# Patient Record
Sex: MALE | Race: WHITE
[De-identification: names, ages, dates, MRNs, and addresses within clinical notes are randomized per-mention and may not be internally consistent; named-entity substitution may affect disease eponyms.]

---

## 2017-01-07 ENCOUNTER — HOSPITAL ENCOUNTER (EMERGENCY)
Dept: HOSPITAL 17 - PHEFT | Age: 42
Discharge: HOME | End: 2017-01-07
Payer: COMMERCIAL

## 2017-01-07 VITALS
TEMPERATURE: 98.3 F | DIASTOLIC BLOOD PRESSURE: 80 MMHG | OXYGEN SATURATION: 96 % | HEART RATE: 81 BPM | RESPIRATION RATE: 16 BRPM | SYSTOLIC BLOOD PRESSURE: 109 MMHG

## 2017-01-07 VITALS — WEIGHT: 205.03 LBS | BODY MASS INDEX: 24.97 KG/M2 | HEIGHT: 76 IN
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
## 2017-01-07 DIAGNOSIS — S61.210A: Primary | ICD-10-CM

## 2017-01-07 PROCEDURE — 96372 THER/PROPH/DIAG INJ SC/IM: CPT

## 2017-01-07 PROCEDURE — 73140 X-RAY EXAM OF FINGER(S): CPT

## 2017-01-07 PROCEDURE — 90714 TD VACC NO PRESV 7 YRS+ IM: CPT

## 2017-01-07 PROCEDURE — 12001 RPR S/N/AX/GEN/TRNK 2.5CM/<: CPT

## 2017-01-07 PROCEDURE — 90471 IMMUNIZATION ADMIN: CPT

## 2017-01-07 NOTE — RADHPO
EXAM DATE/TIME:  01/07/2017 19:29 

 

[facility] COMPARISON:     

No previous studies available for comparison.

 

                     

INDICATIONS :     

Right hand second digit pain and laceration. Patient states he put his hand through a window. 

                     

 

MEDICAL HISTORY :     

None.          

 

SURGICAL HISTORY :     

None.   

 

ENCOUNTER:     

Initial                                        

 

ACUITY:     

1 day      

 

PAIN SCORE:     

9/10

 

LOCATION:     

Right  hand, second digit.

 

FINDINGS:     

Examination of the second digit of the right hand demonstrates no evidence of fracture or dislocation
.  No radiopaque foreign bodies are seen.  The soft tissues are intact. No foreign body.

 

 

CONCLUSION:     

Unremarkable examination of the right second finger.  

 

 

 

 Dmitriy Aguayo MD on January 07, 2017 at 19:36           

Board Certified Radiologist.

 This report was verified electronically.

## 2017-03-31 ENCOUNTER — HOSPITAL ENCOUNTER (OUTPATIENT)
Dept: HOSPITAL 17 - PHSDC | Age: 42
Discharge: HOME | End: 2017-03-31
Attending: PAIN MEDICINE
Payer: COMMERCIAL

## 2017-03-31 DIAGNOSIS — M54.6: Primary | ICD-10-CM

## 2017-03-31 PROCEDURE — 99152 MOD SED SAME PHYS/QHP 5/>YRS: CPT

## 2017-03-31 PROCEDURE — 64492 INJ PARAVERT F JNT C/T 3 LEV: CPT

## 2017-03-31 PROCEDURE — 64491 INJ PARAVERT F JNT C/T 2 LEV: CPT

## 2017-03-31 PROCEDURE — 64490 INJ PARAVERT F JNT C/T 1 LEV: CPT

## 2017-04-05 NOTE — M6
cc:

GREGORIA SPRINGER M.D.

****

 

 

DATE

03/31/2017

 

DATE OF BIRTH

1975

 

PROCEDURE

Fluoroscopically guided injection bilateral thoracic facet joints (bilateral

T5-6, T6-7 and T7-8 facet joints).

 

History and physical was completed and signed. Consent was signed. Procedure

site was marked. Medications were listed and reconciled. Pain score was

recorded. Allergies were noted. Time out was taken. Fluoroscopy time was

recorded where applicable.

Sedation was administered or directed by Dr. Springer. The patient was given

oxygen. The patient was monitored by a registered nurse. Total procedure time

was greater than 15 minutes.

 

 

PROCEDURE NOTE

IV was started.  Blood pressure cuff, pulse oximeter and EKG were applied.  The

patient was placed in the prone position on a Lars table, sedated with small

amounts of propofol titrated to effect.  Vital signs were monitored and

remained stable throughout the procedure.  The lumbar area was prepped with

alcohol and 10% Betadine solution and draped with sterile drapes.  Fluoroscopy

was used to visualize the bilateral thoracic facets at T5-6, T6-7 and T7-8.

Separate sterile 3-1/2-inch, 25-gauge spinal needles were advanced down to

these joints under fluoroscopic guidance.  There was negative aspiration for

blood or any other type of fluid.  At each location the patient was given 1 mL

of 0.5% Marcaine and 10 mg of Kenalog.

 

 

Following the procedure the patient was taken to the recovery room with stable

vital signs, neurologically intact.  He will be evaluated immediately and with

followup to determine if he has a

 

 

 

subjective decrease in his usual pain and a corresponding objective increase in

his functional capabilities.

 

 

                              _________________________________

                              W. MD LIZZY Cabello/KARINE

D:  3/31/2017/8:23 AM

T:  4/5/2017/1:30 PM

Visit #:  [id number]

Job #:  [id number]

## 2017-05-12 ENCOUNTER — HOSPITAL ENCOUNTER (EMERGENCY)
Dept: HOSPITAL 17 - NEPE | Age: 42
LOS: 1 days | Discharge: HOME | End: 2017-05-13
Payer: COMMERCIAL

## 2017-05-12 VITALS
TEMPERATURE: 97.8 F | SYSTOLIC BLOOD PRESSURE: 136 MMHG | HEART RATE: 69 BPM | RESPIRATION RATE: 14 BRPM | DIASTOLIC BLOOD PRESSURE: 73 MMHG | OXYGEN SATURATION: 98 %

## 2017-05-12 VITALS — RESPIRATION RATE: 19 BRPM | OXYGEN SATURATION: 99 %

## 2017-05-12 VITALS — HEIGHT: 76 IN | BODY MASS INDEX: 24.7 KG/M2 | WEIGHT: 202.83 LBS

## 2017-05-12 DIAGNOSIS — F32.9: ICD-10-CM

## 2017-05-12 DIAGNOSIS — K21.9: ICD-10-CM

## 2017-05-12 DIAGNOSIS — R60.0: Primary | ICD-10-CM

## 2017-05-12 DIAGNOSIS — F41.9: ICD-10-CM

## 2017-05-12 DIAGNOSIS — J45.909: ICD-10-CM

## 2017-05-12 LAB
ALP SERPL-CCNC: 73 U/L (ref 45–117)
ALT SERPL-CCNC: 23 U/L (ref 12–78)
ANION GAP SERPL CALC-SCNC: 7 MEQ/L (ref 5–15)
AST SERPL-CCNC: 23 U/L (ref 15–37)
BASOPHILS # BLD AUTO: 0 TH/MM3 (ref 0–0.2)
BASOPHILS NFR BLD: 0.9 % (ref 0–2)
BILIRUB SERPL-MCNC: 0.2 MG/DL (ref 0.2–1)
BUN SERPL-MCNC: 15 MG/DL (ref 7–18)
CHLORIDE SERPL-SCNC: 104 MEQ/L (ref 98–107)
EOSINOPHIL # BLD: 0.1 TH/MM3 (ref 0–0.4)
EOSINOPHIL NFR BLD: 2 % (ref 0–4)
ERYTHROCYTE [DISTWIDTH] IN BLOOD BY AUTOMATED COUNT: 13 % (ref 11.6–17.2)
GFR SERPLBLD BASED ON 1.73 SQ M-ARVRAT: 100 ML/MIN (ref 89–?)
HCO3 BLD-SCNC: 31.4 MEQ/L (ref 21–32)
HCT VFR BLD CALC: 41.5 % (ref 39–51)
HEMO FLAGS: (no result)
LYMPHOCYTES # BLD AUTO: 2.3 TH/MM3 (ref 1–4.8)
LYMPHOCYTES NFR BLD AUTO: 42.2 % (ref 9–44)
MCH RBC QN AUTO: 30.9 PG (ref 27–34)
MCHC RBC AUTO-ENTMCNC: 34.5 % (ref 32–36)
MCV RBC AUTO: 89.5 FL (ref 80–100)
MONOCYTES NFR BLD: 8.8 % (ref 0–8)
NEUTROPHILS # BLD AUTO: 2.5 TH/MM3 (ref 1.8–7.7)
NEUTROPHILS NFR BLD AUTO: 46.1 % (ref 16–70)
PLATELET # BLD: 263 TH/MM3 (ref 150–450)
POTASSIUM SERPL-SCNC: 3.5 MEQ/L (ref 3.5–5.1)
RBC # BLD AUTO: 4.64 MIL/MM3 (ref 4.5–5.9)
SODIUM SERPL-SCNC: 142 MEQ/L (ref 136–145)
WBC # BLD AUTO: 5.4 TH/MM3 (ref 4–11)

## 2017-05-12 PROCEDURE — 93970 EXTREMITY STUDY: CPT

## 2017-05-12 PROCEDURE — 85025 COMPLETE CBC W/AUTO DIFF WBC: CPT

## 2017-05-12 PROCEDURE — 96374 THER/PROPH/DIAG INJ IV PUSH: CPT

## 2017-05-12 PROCEDURE — 80053 COMPREHEN METABOLIC PANEL: CPT

## 2017-05-12 PROCEDURE — 99284 EMERGENCY DEPT VISIT MOD MDM: CPT

## 2017-05-12 PROCEDURE — 80185 ASSAY OF PHENYTOIN TOTAL: CPT

## 2017-05-12 PROCEDURE — 71010: CPT

## 2017-05-12 NOTE — RADRPT
EXAM DATE/TIME:  05/12/2017 22:20 

 

[facility] COMPARISON:     

No previous studies available for comparison.

        

 

 

INDICATIONS :                

Bilateral leg swelling.

            

 

MEDICAL HISTORY :     

Gastroparesis. Seizures.  Asthma. Depression. Anxiety. MRSA. Throrasic spine fracture.

 

SURGICAL HISTORY :     

None. 

 

ENCOUNTER:     

Initial

 

ACUITY:     

1 day

 

PAIN SCORE:      

2/10

 

LOCATION:      

Bilateral  legs.

                       

 

TECHNIQUE:     

Venous ultrasound of the left and right leg was performed from the inguinal ligament to the proximal 
calf.  Real-time, color Doppler and spectral tracing, compression and augmentation techniques were us
ed.  

 

FINDINGS:     

 

RIGHT LEG:     

There is normal compressibility of the deep venous system from the inguinal region to the proximal ca
lf.  No echogenic clot is seen in the lumen of the common femoral, femoral, popliteal, and posterior 
tibial veins.  There is a normal response of the venous system to proximal and distal augmentation an
d respiration.  

 

LEFT LEG:     

There is normal compressibility of the deep venous system from the inguinal region to the proximal ca
lf.  No echogenic clot is seen in the lumen of the common femoral, femoral, popliteal, and posterior 
tibial veins.  There is a normal response of the venous system to proximal and distal augmentation an
d respiration.  

 

CONCLUSION:     

No evidence of DVT.

 

 

 

 Ron Horan MD on May 12, 2017 at 23:29           

Board Certified Radiologist.

 This report was verified electronically.

## 2017-05-12 NOTE — PD
Physical Exam


Date Seen by Provider:  May 12, 2017


Time Seen by Provider:  21:44


Narrative


41 YOWM C/O ANKLE EDEMA TODAY. THE L>R .PAIN 5/10. NO RECENT ILLNESS.





VSS





AWAITING BED PLACEMENT





Data


Data


Last Documented VS





Vital Signs








  Date Time  Temp Pulse Resp B/P Pulse Ox O2 Delivery O2 Flow Rate FiO2


 


5/12/17 21:40 97.8 69 14 136/73 98 Room Air  











[facility]


Medical Record Reviewed:  No


Supervised Visit with ASHWINI:  Haja Hinds May 12, 2017 21:48

## 2017-05-12 NOTE — RADRPT
EXAM DATE/TIME:  05/12/2017 22:51 

 

[facility] COMPARISON:     

No previous studies available for comparison.

 

                     

INDICATIONS :     

Patient complains of left ankle pain and swelling. Patient states they have no chest complaints. 

                     

 

MEDICAL HISTORY :     

None.          

 

SURGICAL HISTORY :     

None.   

 

ENCOUNTER:     

Initial                                        

 

ACUITY:     

1 day      

 

PAIN SCORE:     

0/10

 

LOCATION:      

chest 

 

FINDINGS:     

A single view of the chest demonstrates the lungs to be symmetrically aerated without evidence of mas
s, infiltrate or effusion.  The cardiomediastinal contours are unremarkable.  Osseous structures are 
intact.

 

CONCLUSION:     Unremarkable exam.

 

 

 

 Ron Horan MD on May 12, 2017 at 23:22           

Board Certified Radiologist.

 This report was verified electronically.

## 2017-05-12 NOTE — PD
HPI


Chief Complaint:  Edema


Time Seen by Provider:  21:57


Travel History


International Travel<30 days:  No


Contact w/Intl Traveler<30days:  No


Traveled to known affect area:  No





History of Present Illness


HPI


This is a 41-year-old male with a history of anxiety disorder, seizure disorder

, percent still complained of bilateral lower extremity edema.  Patient reports 

left greater than right.  Patient has no previous history of swelling of his 

lower extremity is.  The patient denies any long car or plane rides.  The 

patient denies any tobacco history.  The patient denies any estrogen use.  

There is no reported history of significant trauma to his lower extremities.  

The patient denies any change in his current medications.  There is no history 

of cardiac disease.  He states he's on his feet pretty much all day.  He works 

as a  for the [employer].  There is no chest pain, chest 

pressure.  There is no reported shortness of breath.





PFSH


Past Medical History


Asthma:  Yes


Autoimmune Disease:  No


Anxiety:  Yes (CHRISTA-DR. ARNOLD)


Depression:  Yes


Heart Rhythm Problems:  No


Cancer:  No


Cardiac Catheterization:  No


Cardiovascular Problems:  No


High Cholesterol:  No


Congestive Heart Failure:  No


Cerebrovascular Accident:  No


Diabetes:  No


Diminished Hearing:  No


Endocrine:  No


Gastrointestinal Disorders:  Yes (DECREASED GASTRIC  EMPTYING)


GERD:  Yes (GASTROPARESIS)


Genitourinary:  No


Headaches:  No


Hypertension:  No


Immune Disorder:  No


Implanted Vascular Access Dvce:  No


Musculoskeletal:  Yes ( HX T6 FX  FOLLOWING SEIZURE.)


Reproductive:  No


Integumentary:  Yes (MRSA (SKIN))


Immunizations Current:  Yes


Migraines:  No


Myocardial Infarction:  No


Seizures:  Yes


Thyroid Disease:  No


Tetanus Vaccination:  < 5 Years


PNEUMOCCOCAL Vaccine (Year):  2





Past Surgical History


Surgical History:  No Previous Surgery


Abdominal Surgery:  No


Cardiac Surgery:  No


Coronary Artery Bypass Graft:  No


Ear Surgery:  No


Endocrine Surgery:  No


Eye Surgery:  No


Genitourinary Surgery:  No


Gynecologic Surgery:  No


Neurologic Surgery:  No


Oral Surgery:  No


Thoracic Surgery:  No


Other Surgery:  No





Social History


Alcohol Use:  No


Tobacco Use:  No (QUIT 2006)


Substance Use:  No





Allergies-Medications


(Allergen,Severity, Reaction):  


Coded Allergies:  


     Adhesives (Verified  Allergy, Severe, RED RASH, 5/12/17)


     Keflex (Verified  Allergy, Severe, HIVES, 5/12/17)


     Penicillin (Verified  Allergy, Severe, HIVES, 5/12/17)


     *MDRO Multi-Drug Resistant Organism (Verified  Allergy, Unknown, 5/12/17)


 mrsa abd wound 2009


Reported Meds & Prescriptions





Reported Meds & Active Scripts


Active


Lasix (Furosemide) 20 Mg Tab 20 Mg PO DAILY


Reported


Percocet (Oxycodone-Acetaminophen)  mg Tab 1 Tab PO BID PRN


Omeprazole 40 Mg Cap 40 Mg PO DAILY


Ativan (Lorazepam) 0.5 Mg Tab 0.5 Mg PO DAILY PRN


Dilantin (Phenytoin Extended) 100 Mg Cap 300 Mg PO DAILY








Review of Systems


Except as stated in HPI:  all other systems reviewed are Neg


General / Constitutional:  No: Fever, Chills


HENT:  No: Headaches, Lightheadedness


Cardiovascular:  No: Chest Pain or Discomfort, Palpitations


Respiratory:  No: Cough, Shortness of Breath


Gastrointestinal:  No: Nausea, Vomiting


Genitourinary:  No: Urgency, Frequency


Musculoskeletal:  Positive: Edema,  No: Weakness, Pain


Neurologic:  No: Weakness, Dizziness


Psychiatric:  No: Anxiety, Depression





Physical Exam


Narrative


GENERAL: Well-nourished, well-developed patient, in no acute respiratory 

distress.


SKIN: Focused skin assessment warm/dry.


HEAD: Normocephalic/atraumatic.


EYES: No scleral icterus. No injection or drainage. 


NECK: Supple, trachea midline. No JVD or lymphadenopathy.


CARDIOVASCULAR: Regular rate and rhythm without murmurs, gallops, or rubs. 


RESPIRATORY: Breath sounds equal bilaterally. No accessory muscle use.


GASTROINTESTINAL: Abdomen soft, non-tender, nondistended. 


MUSCULOSKELETAL: Bilateral 2+ edema.  Slight increase in size of the left 

versus right.  No Homans sign.  No palpable popliteal cords.


NEUROLOGICAL: Awake and alert. Cranial nerves II through XII intact.  Motor 

grossly within normal limits. Five out of 5 muscle strength in all muscle 

groups.  Normal speech.





Data


Data


Last Documented VS





Vital Signs








  Date Time  Temp Pulse Resp B/P Pulse Ox O2 Delivery O2 Flow Rate FiO2


 


5/12/17 22:02   19  99 Room Air  


 


5/12/17 21:40 97.8 69  136/73    








Orders





 Complete Blood Count With Diff (5/12/17 21:57)


Comprehensive Metabolic Panel (5/12/17 21:57)


Phenytoin (Dilantin) (5/12/17 21:57)


Chest, Single Ap (5/12/17 21:57)


Iv Access Insert/Monitor (5/12/17 21:57)


Ecg Monitoring (5/12/17 21:57)


Oximetry (5/12/17 21:57)


Us Leg Venous Doppler Bilat (5/12/17 22:01)


Lorazepam Inj (Ativan Inj) (5/12/17 23:00)





Labs





 Laboratory Tests








Test 5/12/17





 20:08


 


White Blood Count 5.4 TH/MM3


 


Red Blood Count 4.64 MIL/MM3


 


Hemoglobin 14.3 GM/DL


 


Hematocrit 41.5 %


 


Mean Corpuscular Volume 89.5 FL


 


Mean Corpuscular Hemoglobin 30.9 PG


 


Mean Corpuscular Hemoglobin 34.5 %





Concent 


 


Red Cell Distribution Width 13.0 %


 


Platelet Count 263 TH/MM3


 


Mean Platelet Volume 7.6 FL


 


Neutrophils (%) (Auto) 46.1 %


 


Lymphocytes (%) (Auto) 42.2 %


 


Monocytes (%) (Auto) 8.8 %


 


Eosinophils (%) (Auto) 2.0 %


 


Basophils (%) (Auto) 0.9 %


 


Neutrophils # (Auto) 2.5 TH/MM3


 


Lymphocytes # (Auto) 2.3 TH/MM3


 


Monocytes # (Auto) 0.5 TH/MM3


 


Eosinophils # (Auto) 0.1 TH/MM3


 


Basophils # (Auto) 0.0 TH/MM3


 


CBC Comment DIFF FINAL 


 


Differential Comment  


 


Sodium Level 142 MEQ/L


 


Potassium Level 3.5 MEQ/L


 


Chloride Level 104 MEQ/L


 


Carbon Dioxide Level 31.4 MEQ/L


 


Anion Gap 7 MEQ/L


 


Blood Urea Nitrogen 15 MG/DL


 


Creatinine 0.85 MG/DL


 


Estimat Glomerular Filtration 100 ML/MIN





Rate 


 


Random Glucose 84 MG/DL


 


Calcium Level 8.4 MG/DL


 


Total Bilirubin 0.2 MG/DL


 


Aspartate Amino Transf 23 U/L





(AST/SGOT) 


 


Alanine Aminotransferase 23 U/L





(ALT/SGPT) 


 


Alkaline Phosphatase 73 U/L


 


Total Protein 6.9 GM/DL


 


Albumin 4.1 GM/DL


 


Phenytoin (Dilantin) Level 6.8 MCG/ML











[facility]


Medical Decision Making


Medical Screen Exam Complete:  Yes


Emergency Medical Condition:  Yes


Differential Diagnosis


DVT versus dependent edema versus low albumin


Narrative Course


41-year-old male presents with lower extremity edema.  The patient denies any 

risk factors for DVT.  Ultrasound the bilateral external show no evidence of 

DVT.  The patient's electrolytes are within normal limits.  This is likely 

dependent edema.  The patient is on his feet all day long.  He is instructed to 

purchase support hose.  He is also been given a prescription for Lasix 20 mg 

daily 10 days.  He is encouraged to increase his potassium rich food.





Diagnosis





 Primary Impression:  


 bilateral dependent edema of the lower extremities


 Additional Impression:  


 Subtherapeutic serum dilantin level





***Additional Instructions:


Purchase support hose, medium and wear while on your feet.  Increase her 

potassium rich foods while taking Lasix.  Your Dilantin level was 

subtherapeutic at 6.8.  Call your neurologist for recommendations to increase 

your level.


***Med/Other Pt SpecificInfo:  Prescription(s) given


Scripts


Furosemide (Lasix)20 Mg Tab20 Mg PO DAILY  #10 TAB  Ref 0


   Prov:Riccardo Guillen MD         5/12/17


Disposition:  01 DISCHARGE HOME


Condition:  Stable








Riccardo Guillen MD May 12, 2017 22:40

## 2017-10-09 ENCOUNTER — HOSPITAL ENCOUNTER (OUTPATIENT)
Dept: HOSPITAL 17 - PLAB | Age: 42
End: 2017-10-09
Attending: FAMILY MEDICINE
Payer: COMMERCIAL

## 2017-10-09 DIAGNOSIS — R56.9: Primary | ICD-10-CM

## 2017-10-09 DIAGNOSIS — Z13.0: ICD-10-CM

## 2017-10-09 LAB
ALP SERPL-CCNC: 90 U/L (ref 45–117)
ALT SERPL-CCNC: 20 U/L (ref 12–78)
ANION GAP SERPL CALC-SCNC: 6 MEQ/L (ref 5–15)
AST SERPL-CCNC: 19 U/L (ref 15–37)
BASOPHILS # BLD AUTO: 0.1 TH/MM3 (ref 0–0.2)
BASOPHILS NFR BLD: 1.3 % (ref 0–2)
BILIRUB SERPL-MCNC: 0.2 MG/DL (ref 0.2–1)
BUN SERPL-MCNC: 10 MG/DL (ref 7–18)
CHLORIDE SERPL-SCNC: 104 MEQ/L (ref 98–107)
EOSINOPHIL # BLD: 0.2 TH/MM3 (ref 0–0.4)
EOSINOPHIL NFR BLD: 3.1 % (ref 0–4)
ERYTHROCYTE [DISTWIDTH] IN BLOOD BY AUTOMATED COUNT: 13.2 % (ref 11.6–17.2)
GFR SERPLBLD BASED ON 1.73 SQ M-ARVRAT: 120 ML/MIN (ref 89–?)
HCO3 BLD-SCNC: 28.8 MEQ/L (ref 21–32)
HCT VFR BLD CALC: 44.1 % (ref 39–51)
HDLC SERPL-MCNC: 72.3 MG/DL (ref 40–60)
HEMO FLAGS: (no result)
LDLC SERPL-MCNC: 105 MG/DL (ref 0–99)
LYMPHOCYTES # BLD AUTO: 1.7 TH/MM3 (ref 1–4.8)
LYMPHOCYTES NFR BLD AUTO: 31.5 % (ref 9–44)
MCH RBC QN AUTO: 30.6 PG (ref 27–34)
MCHC RBC AUTO-ENTMCNC: 33.8 % (ref 32–36)
MCV RBC AUTO: 90.7 FL (ref 80–100)
MONOCYTES NFR BLD: 8.7 % (ref 0–8)
NEUTROPHILS # BLD AUTO: 3.1 TH/MM3 (ref 1.8–7.7)
NEUTROPHILS NFR BLD AUTO: 55.4 % (ref 16–70)
PLATELET # BLD: 270 TH/MM3 (ref 150–450)
POTASSIUM SERPL-SCNC: 3.8 MEQ/L (ref 3.5–5.1)
RBC # BLD AUTO: 4.85 MIL/MM3 (ref 4.5–5.9)
SODIUM SERPL-SCNC: 139 MEQ/L (ref 136–145)
WBC # BLD AUTO: 5.5 TH/MM3 (ref 4–11)

## 2017-10-09 PROCEDURE — 82306 VITAMIN D 25 HYDROXY: CPT

## 2017-10-09 PROCEDURE — 80053 COMPREHEN METABOLIC PANEL: CPT

## 2017-10-09 PROCEDURE — 85025 COMPLETE CBC W/AUTO DIFF WBC: CPT

## 2017-10-09 PROCEDURE — 80061 LIPID PANEL: CPT

## 2017-10-09 PROCEDURE — 80185 ASSAY OF PHENYTOIN TOTAL: CPT

## 2018-03-19 ENCOUNTER — HOSPITAL ENCOUNTER (OUTPATIENT)
Dept: HOSPITAL 17 - PLAB | Age: 43
End: 2018-03-19
Attending: FAMILY MEDICINE
Payer: COMMERCIAL

## 2018-03-19 DIAGNOSIS — R56.9: Primary | ICD-10-CM

## 2018-03-19 DIAGNOSIS — E78.5: ICD-10-CM

## 2018-03-19 LAB
ALBUMIN SERPL-MCNC: 3.5 GM/DL (ref 3.4–5)
ALP SERPL-CCNC: 103 U/L (ref 45–117)
ALT SERPL-CCNC: 18 U/L (ref 12–78)
AST SERPL-CCNC: 15 U/L (ref 15–37)
BILIRUB SERPL-MCNC: 0.2 MG/DL (ref 0.2–1)
BUN SERPL-MCNC: 8 MG/DL (ref 7–18)
CALCIUM SERPL-MCNC: 8.6 MG/DL (ref 8.5–10.1)
CHLORIDE SERPL-SCNC: 107 MEQ/L (ref 98–107)
CHOLEST SERPL-MCNC: 185 MG/DL (ref 120–200)
CHOLESTEROL/ HDL RATIO: 2.79 RATIO
CREAT SERPL-MCNC: 0.68 MG/DL (ref 0.6–1.3)
GFR SERPLBLD BASED ON 1.73 SQ M-ARVRAT: 129 ML/MIN (ref 89–?)
HCO3 BLD-SCNC: 28.4 MEQ/L (ref 21–32)
HDLC SERPL-MCNC: 66.2 MG/DL (ref 40–60)
LDLC SERPL-MCNC: 107 MG/DL (ref 0–99)
PHENYTOIN (DILANTIN): 11.7 MCG/ML (ref 10–20)
PROT SERPL-MCNC: 6.5 GM/DL (ref 6.4–8.2)
SODIUM SERPL-SCNC: 144 MEQ/L (ref 136–145)
TRIGL SERPL-MCNC: 61 MG/DL (ref 42–150)

## 2018-03-19 PROCEDURE — 36415 COLL VENOUS BLD VENIPUNCTURE: CPT

## 2018-03-19 PROCEDURE — 80061 LIPID PANEL: CPT

## 2018-03-19 PROCEDURE — 80185 ASSAY OF PHENYTOIN TOTAL: CPT

## 2018-03-19 PROCEDURE — 80053 COMPREHEN METABOLIC PANEL: CPT
